# Patient Record
Sex: MALE | Race: WHITE | NOT HISPANIC OR LATINO | Employment: FULL TIME | URBAN - METROPOLITAN AREA
[De-identification: names, ages, dates, MRNs, and addresses within clinical notes are randomized per-mention and may not be internally consistent; named-entity substitution may affect disease eponyms.]

---

## 2021-08-20 ENCOUNTER — OFFICE VISIT (OUTPATIENT)
Dept: URGENT CARE | Facility: CLINIC | Age: 51
End: 2021-08-20
Payer: OTHER MISCELLANEOUS

## 2021-08-20 VITALS
DIASTOLIC BLOOD PRESSURE: 98 MMHG | SYSTOLIC BLOOD PRESSURE: 146 MMHG | BODY MASS INDEX: 25.06 KG/M2 | OXYGEN SATURATION: 97 % | TEMPERATURE: 98 F | RESPIRATION RATE: 18 BRPM | WEIGHT: 185 LBS | HEART RATE: 73 BPM | HEIGHT: 72 IN

## 2021-08-20 DIAGNOSIS — M25.462 EFFUSION OF LEFT KNEE JOINT: ICD-10-CM

## 2021-08-20 DIAGNOSIS — S83.412A SPRAIN OF MEDIAL COLLATERAL LIGAMENT OF LEFT KNEE, INITIAL ENCOUNTER: ICD-10-CM

## 2021-08-20 DIAGNOSIS — M25.562 LEFT KNEE PAIN, UNSPECIFIED CHRONICITY: Primary | ICD-10-CM

## 2021-08-20 PROCEDURE — 73562 XR KNEE 3 VIEW LEFT: ICD-10-PCS | Mod: FY,LT,S$GLB, | Performed by: RADIOLOGY

## 2021-08-20 PROCEDURE — 99203 PR OFFICE/OUTPT VISIT, NEW, LEVL III, 30-44 MIN: ICD-10-PCS | Mod: S$GLB,,, | Performed by: EMERGENCY MEDICINE

## 2021-08-20 PROCEDURE — 99203 OFFICE O/P NEW LOW 30 MIN: CPT | Mod: S$GLB,,, | Performed by: EMERGENCY MEDICINE

## 2021-08-20 PROCEDURE — 73562 X-RAY EXAM OF KNEE 3: CPT | Mod: FY,LT,S$GLB, | Performed by: RADIOLOGY

## 2021-08-20 RX ORDER — NAPROXEN 500 MG/1
500 TABLET ORAL 2 TIMES DAILY WITH MEALS
Qty: 20 TABLET | Refills: 2 | Status: SHIPPED | OUTPATIENT
Start: 2021-08-20 | End: 2021-08-27 | Stop reason: ALTCHOICE

## 2021-08-20 RX ORDER — METHOCARBAMOL 500 MG/1
1000 TABLET, FILM COATED ORAL 3 TIMES DAILY
Qty: 60 TABLET | Refills: 0 | Status: SHIPPED | OUTPATIENT
Start: 2021-08-20 | End: 2021-08-27

## 2021-08-27 ENCOUNTER — OFFICE VISIT (OUTPATIENT)
Dept: URGENT CARE | Facility: CLINIC | Age: 51
End: 2021-08-27
Payer: OTHER MISCELLANEOUS

## 2021-08-27 DIAGNOSIS — M25.562 LEFT KNEE PAIN, UNSPECIFIED CHRONICITY: Primary | ICD-10-CM

## 2021-08-27 DIAGNOSIS — S83.412D SPRAIN OF MEDIAL COLLATERAL LIGAMENT OF LEFT KNEE, SUBSEQUENT ENCOUNTER: ICD-10-CM

## 2021-08-27 DIAGNOSIS — M25.462 EFFUSION OF LEFT KNEE JOINT: ICD-10-CM

## 2021-08-27 PROCEDURE — 99214 PR OFFICE/OUTPT VISIT, EST, LEVL IV, 30-39 MIN: ICD-10-PCS | Mod: S$GLB,,, | Performed by: PREVENTIVE MEDICINE

## 2021-08-27 PROCEDURE — 99214 OFFICE O/P EST MOD 30 MIN: CPT | Mod: S$GLB,,, | Performed by: PREVENTIVE MEDICINE

## 2021-08-27 RX ORDER — TIZANIDINE 4 MG/1
4 TABLET ORAL NIGHTLY
Qty: 30 TABLET | Refills: 1 | Status: SHIPPED | OUTPATIENT
Start: 2021-08-27 | End: 2021-09-26

## 2021-08-27 RX ORDER — IBUPROFEN 800 MG/1
800 TABLET ORAL 3 TIMES DAILY
Qty: 60 TABLET | Refills: 0 | Status: SHIPPED | OUTPATIENT
Start: 2021-08-27 | End: 2023-03-01

## 2021-09-23 ENCOUNTER — OFFICE VISIT (OUTPATIENT)
Dept: URGENT CARE | Facility: CLINIC | Age: 51
End: 2021-09-23
Payer: OTHER MISCELLANEOUS

## 2021-09-23 DIAGNOSIS — M25.462 EFFUSION OF LEFT KNEE JOINT: ICD-10-CM

## 2021-09-23 DIAGNOSIS — M25.462 SWELLING OF JOINT OF LEFT KNEE: ICD-10-CM

## 2021-09-23 DIAGNOSIS — S83.242D ACUTE MEDIAL MENISCUS TEAR OF LEFT KNEE, SUBSEQUENT ENCOUNTER: Primary | ICD-10-CM

## 2021-09-23 PROCEDURE — 99214 OFFICE O/P EST MOD 30 MIN: CPT | Mod: S$GLB,,, | Performed by: EMERGENCY MEDICINE

## 2021-09-23 PROCEDURE — 99214 PR OFFICE/OUTPT VISIT, EST, LEVL IV, 30-39 MIN: ICD-10-PCS | Mod: S$GLB,,, | Performed by: EMERGENCY MEDICINE

## 2021-10-15 ENCOUNTER — TELEPHONE (OUTPATIENT)
Dept: URGENT CARE | Facility: CLINIC | Age: 51
End: 2021-10-15

## 2023-03-01 ENCOUNTER — HOSPITAL ENCOUNTER (OUTPATIENT)
Dept: RADIOLOGY | Facility: HOSPITAL | Age: 53
Discharge: HOME | End: 2023-03-01
Attending: FAMILY MEDICINE
Payer: COMMERCIAL

## 2023-03-01 DIAGNOSIS — R05.3 CHRONIC COUGH: ICD-10-CM

## 2023-03-01 DIAGNOSIS — R06.02 SOB (SHORTNESS OF BREATH): ICD-10-CM

## 2023-03-01 DIAGNOSIS — F17.210 CIGARETTE NICOTINE DEPENDENCE WITHOUT COMPLICATION: ICD-10-CM

## 2023-03-01 PROCEDURE — 71046 XR CHEST PA AND LATERAL: ICD-10-PCS | Mod: 26,,, | Performed by: RADIOLOGY

## 2023-03-01 PROCEDURE — 71046 X-RAY EXAM CHEST 2 VIEWS: CPT | Mod: 26,,, | Performed by: RADIOLOGY

## 2023-03-01 PROCEDURE — 71046 X-RAY EXAM CHEST 2 VIEWS: CPT | Mod: TC,FY

## 2023-04-04 ENCOUNTER — HOSPITAL ENCOUNTER (OUTPATIENT)
Dept: PULMONOLOGY | Facility: HOSPITAL | Age: 53
Discharge: HOME | End: 2023-04-04
Attending: FAMILY MEDICINE
Payer: COMMERCIAL

## 2023-04-04 DIAGNOSIS — F17.210 CIGARETTE NICOTINE DEPENDENCE WITHOUT COMPLICATION: ICD-10-CM

## 2023-04-04 DIAGNOSIS — R06.02 SOB (SHORTNESS OF BREATH): ICD-10-CM

## 2023-04-04 DIAGNOSIS — R05.3 CHRONIC COUGH: ICD-10-CM

## 2023-04-04 PROCEDURE — 94060 PR EVAL OF BRONCHOSPASM: ICD-10-PCS | Mod: 26,,, | Performed by: INTERNAL MEDICINE

## 2023-04-04 PROCEDURE — 94729 DIFFUSING CAPACITY: CPT | Mod: 26,,, | Performed by: INTERNAL MEDICINE

## 2023-04-04 PROCEDURE — 94729 DIFFUSING CAPACITY: CPT

## 2023-04-04 PROCEDURE — 94640 AIRWAY INHALATION TREATMENT: CPT | Mod: 59

## 2023-04-04 PROCEDURE — 94010 BREATHING CAPACITY TEST: CPT

## 2023-04-04 PROCEDURE — 94060 EVALUATION OF WHEEZING: CPT | Mod: 26,,, | Performed by: INTERNAL MEDICINE

## 2023-04-04 PROCEDURE — 94726 PULM FUNCT TST PLETHYSMOGRAP: ICD-10-PCS | Mod: 26,,, | Performed by: INTERNAL MEDICINE

## 2023-04-04 PROCEDURE — 94726 PLETHYSMOGRAPHY LUNG VOLUMES: CPT | Mod: 26,,, | Performed by: INTERNAL MEDICINE

## 2023-04-04 PROCEDURE — 94726 PLETHYSMOGRAPHY LUNG VOLUMES: CPT

## 2023-04-04 PROCEDURE — 94729 PR C02/MEMBANE DIFFUSE CAPACITY: ICD-10-PCS | Mod: 26,,, | Performed by: INTERNAL MEDICINE

## 2023-04-10 LAB
BRPFT: ABNORMAL
DLCO ADJ PRE: 18.68 ML/(MIN*MMHG) (ref 26.39–40.24)
DLCO SINGLE BREATH LLN: 26.39
DLCO SINGLE BREATH PRE REF: 57.9 %
DLCO SINGLE BREATH REF: 33.32
DLCOC SBVA LLN: 3.22
DLCOC SBVA PRE REF: 91.6 %
DLCOC SBVA REF: 4.33
DLCOC SINGLE BREATH LLN: 26.39
DLCOC SINGLE BREATH PRE REF: 56.1 %
DLCOC SINGLE BREATH REF: 33.32
DLCOVA LLN: 3.22
DLCOVA PRE REF: 94.6 %
DLCOVA PRE: 4.09 ML/(MIN*MMHG*L) (ref 3.22–5.44)
DLCOVA REF: 4.33
DLVAADJ PRE: 3.96 ML/(MIN*MMHG*L) (ref 3.22–5.44)
ERV LLN: -16448.62
ERV PRE REF: 43 %
ERV REF: 1.38
FEF 25 75 CHG: 2.9 %
FEF 25 75 LLN: 2.03
FEF 25 75 POST REF: 21.8 %
FEF 25 75 PRE REF: 21.2 %
FEF 25 75 REF: 3.77
FET100 CHG: 8.7 %
FEV1 CHG: 13.5 %
FEV1 FVC CHG: 6.4 %
FEV1 FVC LLN: 68
FEV1 FVC POST REF: 71.2 %
FEV1 FVC PRE REF: 67 %
FEV1 FVC REF: 78
FEV1 LLN: 3.31
FEV1 POST REF: 52 %
FEV1 PRE REF: 45.8 %
FEV1 REF: 4.26
FRCPLETH LLN: 2.71
FRCPLETH PREREF: 115 %
FRCPLETH REF: 3.7
FVC CHG: 6.7 %
FVC LLN: 4.25
FVC POST REF: 72.6 %
FVC PRE REF: 68.1 %
FVC REF: 5.46
IVC PRE: 3.3 L (ref 4.25–6.66)
IVC SINGLE BREATH LLN: 4.25
IVC SINGLE BREATH PRE REF: 60.5 %
IVC SINGLE BREATH REF: 5.46
PEF CHG: 22.8 %
PEF LLN: 7.92
PEF POST REF: 62.5 %
PEF PRE REF: 50.9 %
PEF REF: 10.43
POST FEF 25 75: 0.82 L/S (ref 2.03–5.52)
POST FET 100: 7.54 SEC
POST FEV1 FVC: 55.87 % (ref 67.5–89.37)
POST FEV1: 2.21 L (ref 3.31–5.2)
POST FVC: 3.96 L (ref 4.25–6.66)
POST PEF: 6.52 L/S (ref 7.92–12.94)
PRE DLCO: 19.28 ML/(MIN*MMHG) (ref 26.39–40.24)
PRE ERV: 0.59 L (ref -16448.62–16451.38)
PRE FEF 25 75: 0.8 L/S (ref 2.03–5.52)
PRE FET 100: 6.94 SEC
PRE FEV1 FVC: 52.52 % (ref 67.5–89.37)
PRE FEV1: 1.95 L (ref 3.31–5.2)
PRE FRC PL: 4.25 L
PRE FVC: 3.71 L (ref 4.25–6.66)
PRE PEF: 5.31 L/S (ref 7.92–12.94)
PRE RV: 3.3 L (ref 1.64–2.99)
PRE TLC: 7.15 L (ref 6.55–8.85)
RAW LLN: 3.06
RAW PRE REF: 101.6 %
RAW PRE: 3.11 CMH2O*S/L (ref 3.06–3.06)
RAW REF: 3.06
RV LLN: 1.64
RV PRE REF: 142.5 %
RV REF: 2.32
RVTLC LLN: 25
RVTLC PRE REF: 136.4 %
RVTLC PRE: 46.16 % (ref 24.87–42.83)
RVTLC REF: 34
TLC LLN: 6.55
TLC PRE REF: 92.8 %
TLC REF: 7.7
VA PRE: 4.71 L (ref 7.55–7.55)
VA SINGLE BREATH LLN: 7.55
VA SINGLE BREATH PRE REF: 62.4 %
VA SINGLE BREATH REF: 7.55
VC LLN: 4.25
VC PRE REF: 70.5 %
VC PRE: 3.85 L (ref 4.25–6.66)
VC REF: 5.46
VTGRAWPRE: 4.83 L

## 2023-04-24 PROBLEM — I10 HYPERTENSION: Status: ACTIVE | Noted: 2023-04-24

## 2023-04-24 PROBLEM — N52.9 ED (ERECTILE DYSFUNCTION): Status: ACTIVE | Noted: 2023-04-24

## 2023-04-24 PROBLEM — J44.9 CHRONIC OBSTRUCTIVE PULMONARY DISEASE: Status: ACTIVE | Noted: 2023-04-24

## 2023-04-24 PROBLEM — J44.9 CHRONIC OBSTRUCTIVE PULMONARY DISEASE: Chronic | Status: ACTIVE | Noted: 2023-04-24

## 2023-09-21 PROBLEM — K21.9 GASTROESOPHAGEAL REFLUX DISEASE: Status: ACTIVE | Noted: 2023-09-21

## 2023-12-04 PROBLEM — I10 HYPERTENSION: Chronic | Status: ACTIVE | Noted: 2023-04-24

## 2024-03-20 ENCOUNTER — HOSPITAL ENCOUNTER (EMERGENCY)
Facility: HOSPITAL | Age: 54
Discharge: HOME | End: 2024-03-20
Attending: STUDENT IN AN ORGANIZED HEALTH CARE EDUCATION/TRAINING PROGRAM
Payer: COMMERCIAL

## 2024-03-20 VITALS
BODY MASS INDEX: 30.88 KG/M2 | TEMPERATURE: 99 F | HEIGHT: 73 IN | OXYGEN SATURATION: 97 % | RESPIRATION RATE: 18 BRPM | DIASTOLIC BLOOD PRESSURE: 91 MMHG | WEIGHT: 233 LBS | HEART RATE: 81 BPM | SYSTOLIC BLOOD PRESSURE: 146 MMHG

## 2024-03-20 DIAGNOSIS — S61.214A LACERATION OF RIGHT RING FINGER WITHOUT FOREIGN BODY WITHOUT DAMAGE TO NAIL, INITIAL ENCOUNTER: Primary | ICD-10-CM

## 2024-03-20 PROCEDURE — 12001 RPR S/N/AX/GEN/TRNK 2.5CM/<: CPT

## 2024-03-20 PROCEDURE — 25000003 PHARM REV CODE 250

## 2024-03-20 PROCEDURE — 99284 EMERGENCY DEPT VISIT MOD MDM: CPT

## 2024-03-20 RX ORDER — LIDOCAINE HYDROCHLORIDE 10 MG/ML
5 INJECTION, SOLUTION EPIDURAL; INFILTRATION; INTRACAUDAL; PERINEURAL
Status: COMPLETED | OUTPATIENT
Start: 2024-03-20 | End: 2024-03-20

## 2024-03-20 RX ADMIN — LIDOCAINE HYDROCHLORIDE 50 MG: 10 INJECTION, SOLUTION EPIDURAL; INFILTRATION; INTRACAUDAL; PERINEURAL at 08:03

## 2024-03-21 NOTE — ED NOTES
Pt reports to ED with c/o laceration to right third digit that happened just PTA s/t using knife. Bleeding noted. Gauze and coban placed to site. Pt unsure when last tetanus shot was. Pt denies blood thinners.     Adult Physical Assessment  LOC: Satya Darby, 53 y.o. male verified via two identifiers.  The patient is awake, alert, oriented and speaking appropriately at this time.  APPEARANCE: Patient resting comfortably and appears to be in no acute distress at this time. Patient is clean and well groomed, patient's clothing is properly fastened.  SKIN:The skin is warm and dry, color consistent with ethnicity, patient has normal skin turgor and moist mucus membranes. Laceration noted to right third digit.   MUSCULOSKELETAL: Patient moving all extremities well, no obvious swelling or deformities noted.  RESPIRATORY: Airway is open and patent, respirations are spontaneous, patient has a normal effort and rate, no accessory muscle use noted.  CARDIAC: Patient has a normal rate and rhythm, no periphreal edema noted in any extremity, capillary refill < 3 seconds in all extremities  ABDOMEN: Soft and non tender to palpation, no abdominal distention noted. Bowel sounds present in all four quadrants.  NEUROLOGIC: Eyes open spontaneously, behavior appropriate to situation, follows commands, facial expression symmetrical, bilateral hand grasp equal and even, purposeful motor response noted, normal sensation in all extremities when touched with a finger.

## 2024-03-21 NOTE — ED PROVIDER NOTES
ED Provider Note - 3/20/2024    History     Chief Complaint   Patient presents with    Laceration     Pt reports to ED with c/o laceration to right third digit s/t cutting with knife. Pt unsure last tetanus.        HPI     Satya Darby is a 53 y.o. year old male with past medical and surgical history as seen below, presenting with chief complaint of laceration of finger.  Patient was cleaning a knife when it slipped and hit right 4th digit.  Occurred just prior to arrival in emergency department.  Patient states up-to-date on tetanus from last December.  No other injuries or complaints.  No strength or sensation deficit.    Past Medical History:   Diagnosis Date    Kidney stones      Past Surgical History:   Procedure Laterality Date    APPENDECTOMY  12/22/2014    REPAIR OF MENISCUS OF KNEE Left 10/2021    TONSILLECTOMY           No family history on file.  Social History     Tobacco Use    Smoking status: Former     Types: Cigarettes     Start date: 06/2023    Smokeless tobacco: Never   Substance Use Topics    Alcohol use: Yes     Comment: occasionally    Drug use: No     Social Determinants of Health with Concerns     Tobacco Use: Medium Risk (3/5/2024)    Patient History     Smoking Tobacco Use: Former     Smokeless Tobacco Use: Never     Passive Exposure: Not on file   Alcohol Use: Not on file   Financial Resource Strain: Not on file   Food Insecurity: Not on file   Transportation Needs: Not on file   Physical Activity: Not on file   Stress: Not on file   Social Connections: Not on file   Housing Stability: Not on file   Depression: Not on file      Review of patient's allergies indicates:  No Known Allergies    Review of Systems     A full Review of Systems (ROS) was performed and was negative unless otherwise stated in the HPI.      Physical Exam     Vitals:    03/20/24 1944 03/20/24 1947   BP: (!) 146/91    Patient Position: Sitting    Pulse: 81    Resp: 18    Temp:  98.5 °F (36.9 °C)   TempSrc: Oral Oral  "  SpO2: 97%    Weight: 105.7 kg (233 lb)    Height: 6' 1" (1.854 m)         Physical Exam    Nursing note and vitals reviewed.  Constitutional: He appears well-developed and well-nourished. No distress.   HENT:   Head: Normocephalic and atraumatic.   Right Ear: External ear normal.   Left Ear: External ear normal.   Nose: Nose normal.   Eyes: Conjunctivae and EOM are normal. Pupils are equal, round, and reactive to light.   Neck: Neck supple.   Normal range of motion.  Cardiovascular:  Normal rate and regular rhythm.           Pulmonary/Chest: Breath sounds normal. No respiratory distress.   Musculoskeletal:         General: No edema. Normal range of motion.      Cervical back: Normal range of motion and neck supple.      Comments: 1.25 cm laceration to medial right 4th digit with slow venous oozing.  Patient exhibits good strength through all extensor and flexor tendon distributions.  Normal sensation throughout hand and fingers.     Neurological: He is alert and oriented to person, place, and time. He has normal strength. No cranial nerve deficit or sensory deficit.   Skin: Skin is warm and dry. No rash noted.   Psychiatric: He has a normal mood and affect. Thought content normal.         Lab Results- Independently reviewed by myself      Labs Reviewed - No data to display        Imaging     Imaging Results    None                    ED Course         Lac Repair    Date/Time: 3/20/2024 8:38 PM    Performed by: Silverio Asher MD  Authorized by: Silverio sAher MD    Consent:     Consent obtained:  Verbal    Consent given by:  Parent    Risks, benefits, and alternatives were discussed: yes      Risks discussed:  Need for additional repair, infection and pain    Alternatives discussed:  No treatment and observation  Universal protocol:     Procedure explained and questions answered to patient or proxy's satisfaction: yes      Patient identity confirmed:  Verbally with patient and hospital-assigned identification " number  Anesthesia:     Anesthesia method:  None  Laceration details:     Location:  Finger    Finger location:  R ring finger    Length (cm):  1  Exploration:     Limited defect created (wound extended): no      Hemostasis achieved with:  Direct pressure    Wound exploration: wound explored through full range of motion and entire depth of wound visualized      Contaminated: no    Treatment:     Area cleansed with:  Shur-Clens    Amount of cleaning:  Standard    Irrigation method:  Pressure wash    Debridement:  None    Undermining:  None  Skin repair:     Repair method:  Tissue adhesive (Finger tourniquet applied for 3-4 minutes during procedure.)  Approximation:     Approximation:  Close  Repair type:     Repair type:  Simple  Post-procedure details:     Dressing: Slava-tape.    Procedure completion:  Tolerated well, no immediate complications           Orders Placed This Encounter    LACERATION REPAIR    LIDOcaine (PF) 10 mg/ml (1%) injection 50 mg    Tdap (BOOSTRIX) vaccine injection 0.5 mL                      Medical Decision Making       The patient's list of active medical problems, social history, medications, and allergies as documented per RN staff has been reviewed.           Medical Decision Making  This is a 53 y.o. male presenting with a laceration of the finger. Wound inspected under direct bright light with good visualization.  Laceration repaired in fashion described above.  No evidence of foreign body.  Patient tolerated procedure well and neurovascular exam intact and unchanged post repair with intact distal pulses and cap refill. Cautious return precautions discussed w/ full understanding. Wound care discussed. Follow up with primary care physician as warranted.                      Clinical Impression       Follow-up Information       Follow up With Specialties Details Why Contact Nakul Constantino MD Family Medicine Schedule an appointment as soon as possible for a visit in   days For follow-up on today's visit. [address]  [address]  [facility] [address]  [phone]      [facility] Emergency Medicine Go to  For wound re-check, As needed [address] [address]  [facility] [address] [phone]            Referrals:  No orders of the defined types were placed in this encounter.      Disposition   ED Disposition Condition    Discharge Stable            Diagnosis    ICD-10-CM ICD-9-CM   1. Laceration of right ring finger without foreign body without damage to nail, initial encounter  S61.214A 883.0           Silverio Asher MD        03/20/2024          DISCLAIMER: This note was prepared with [employer] voice recognition transcription software. Garbled syntax, mangled pronouns, and other bizarre constructions may be attributed to that software system.       Silverio Asher MD  03/20/24 [id number]

## 2024-09-05 PROBLEM — F17.211 CIGARETTE NICOTINE DEPENDENCE IN REMISSION: Status: ACTIVE | Noted: 2023-03-01

## 2024-09-29 ENCOUNTER — TELEPHONE (OUTPATIENT)
Dept: PHARMACY | Facility: CLINIC | Age: 54
End: 2024-09-29
Payer: COMMERCIAL

## 2024-09-29 NOTE — TELEPHONE ENCOUNTER
[facility]Population [facility] Chart Review & Patient Outreach Details For Medication Adherence Project    Reason for Outreach Encounter: 3rd Party payor non-compliance report ([employer], [employer], C, etc)  2.  Patient Outreach Method: Reviewed Patient Chart  3.   Medication in question:    LAST FILLED: 9/19/24 for 30 day supply  Hypertension Medications               losartan (COZAAR) 50 MG tablet Take 1 tablet (50 mg total) by mouth once daily.              4.  Reviewed and or Updates Made To: Patient Chart  5. Outreach Outcomes and/or actions taken: Patient filled medication and is on track to be adherent

## 2024-10-21 ENCOUNTER — TELEPHONE (OUTPATIENT)
Dept: PHARMACY | Facility: CLINIC | Age: 54
End: 2024-10-21
Payer: COMMERCIAL

## 2024-10-21 NOTE — TELEPHONE ENCOUNTER
[facility] Chart Review & Patient Outreach Details For Medication Adherence Project    Reason for Outreach Encounter: 3rd Party payor non-compliance report ([employer], [employer], C, etc)  2.  Patient Outreach Method: Reviewed Patient Chart  3.   Medication in question: losartan   LAST FILLED: 10/15/24 for 30 day supply  Hypertension Medications               losartan (COZAAR) 50 MG tablet Take 1 tablet (50 mg total) by mouth once daily.              4.  Reviewed and or Updates Made To: Patient Chart  5. Outreach Outcomes and/or actions taken: Patient filled medication and is on track to be adherent